# Patient Record
Sex: MALE | Race: WHITE | Employment: OTHER | URBAN - METROPOLITAN AREA
[De-identification: names, ages, dates, MRNs, and addresses within clinical notes are randomized per-mention and may not be internally consistent; named-entity substitution may affect disease eponyms.]

---

## 2019-08-29 ENCOUNTER — TRANSFERRED RECORDS (OUTPATIENT)
Dept: HEALTH INFORMATION MANAGEMENT | Facility: CLINIC | Age: 84
End: 2019-08-29

## 2019-09-04 ENCOUNTER — TRANSFERRED RECORDS (OUTPATIENT)
Dept: HEALTH INFORMATION MANAGEMENT | Facility: CLINIC | Age: 84
End: 2019-09-04

## 2019-09-16 ENCOUNTER — TRANSFERRED RECORDS (OUTPATIENT)
Dept: HEALTH INFORMATION MANAGEMENT | Facility: CLINIC | Age: 84
End: 2019-09-16

## 2020-01-15 ENCOUNTER — TRANSCRIBE ORDERS (OUTPATIENT)
Dept: OTHER | Age: 85
End: 2020-01-15

## 2020-01-15 DIAGNOSIS — M53.3 SACROILIAC PAIN: Primary | ICD-10-CM

## 2020-01-20 NOTE — TELEPHONE ENCOUNTER
DIAGNOSIS: 2nd opinion, sacroiliac pain/ Dr. Clint Echeverria from [place] referring to Dr. Viki Villasenor per pt daughter Tana   APPOINTMENT DATE: Mar 19, 2020    NOTES STATUS DETAILS   OFFICE NOTE from referring provider [facility] Everywhere 1/14/20 Dr. Villanueva and Dr. Gomez, [place]    1/13/20 Dr. Gaffney, [place]   OFFICE NOTE from other specialist In process [facility]    DISCHARGE SUMMARY from hospital N/A    DISCHARGE REPORT from the ER N/A    OPERATIVE REPORT N/A    MEDICATION LIST Internal    IMPLANT RECORD/STICKER N/A    LABS     CBC/DIFF N/A    CULTURES N/A    INJECTIONS DONE IN RADIOLOGY In process [facility] 11/12/19    [place] 1/13/20, 5/2/19, 3/5/19, 2/20/19, 11/9/18, 6/28/18   MRI In process [place] 1/16/20, 3/21/19, 6/12/18   CT SCAN N/A    XRAYS (IMAGES & REPORTS) In process [place] 12/17/19, 2/11/19, 2/5/19, 11/13/18, 4/6/18, 6/24/14   TUMOR     PATHOLOGY  Slides & report N/A       01/19/20 SE  7:32 PM  Faxed request to [facility] for images, and to [facility] for records.     3.4.20 sv 10:40  Images and reports received from [place] and [facility]

## 2020-03-13 DIAGNOSIS — M53.3 SACROILIAC PAIN: Primary | ICD-10-CM

## 2020-03-19 ENCOUNTER — PRE VISIT (OUTPATIENT)
Dept: ORTHOPEDICS | Facility: CLINIC | Age: 85
End: 2020-03-19

## 2020-04-23 ENCOUNTER — TELEPHONE (OUTPATIENT)
Dept: ORTHOPEDICS | Facility: CLINIC | Age: 85
End: 2020-04-23

## 2020-04-23 NOTE — TELEPHONE ENCOUNTER
[facility] Call Center    Phone Message    May a detailed message be left on voicemail: yes     Reason for Call: Other: Brenda from [facility] would like a call back to know if the pt needs the images done right away or if they can wait until after the covid restriction. PLease contact Brenda to discuss     Action Taken: Message routed to:  [facility] ([facility] Ortho    Travel Screening: Not Applicable

## 2020-04-30 ENCOUNTER — TELEPHONE (OUTPATIENT)
Dept: ORTHOPEDICS | Facility: CLINIC | Age: 85
End: 2020-04-30

## 2020-04-30 DIAGNOSIS — M53.3 SACROILIAC PAIN: Primary | ICD-10-CM

## 2020-04-30 NOTE — TELEPHONE ENCOUNTER
Writer called and left pt a message confirming apt on 5/13/20  with Dr. Drew. Writer informed pt that at this time he would not be allowed a visitor with him in the building. If he has questions or concerns he can call back to [phone].     Charla Mcgraw LPN

## 2020-05-13 ENCOUNTER — OFFICE VISIT (OUTPATIENT)
Dept: ORTHOPEDICS | Facility: CLINIC | Age: 85
End: 2020-05-13
Payer: MEDICARE

## 2020-05-13 ENCOUNTER — ANCILLARY PROCEDURE (OUTPATIENT)
Dept: GENERAL RADIOLOGY | Facility: CLINIC | Age: 85
End: 2020-05-13
Attending: ORTHOPAEDIC SURGERY
Payer: MEDICARE

## 2020-05-13 VITALS — WEIGHT: 177.4 LBS | BODY MASS INDEX: 26.89 KG/M2 | HEIGHT: 68 IN

## 2020-05-13 DIAGNOSIS — M79.18 BUTTOCK PAIN: ICD-10-CM

## 2020-05-13 DIAGNOSIS — M25.552 PAIN OF LEFT HIP JOINT: Primary | ICD-10-CM

## 2020-05-13 DIAGNOSIS — M40.30 FLATBACK SYNDROME: ICD-10-CM

## 2020-05-13 RX ORDER — METOPROLOL TARTRATE 25 MG/1
25 TABLET, FILM COATED ORAL 2 TIMES DAILY
COMMUNITY
Start: 2019-08-07 | End: 2020-08-28

## 2020-05-13 RX ORDER — ATORVASTATIN CALCIUM 20 MG/1
1 TABLET, FILM COATED ORAL DAILY
COMMUNITY
Start: 2020-04-21

## 2020-05-13 RX ORDER — LISINOPRIL 5 MG/1
5 TABLET ORAL DAILY
COMMUNITY
Start: 2019-08-07 | End: 2020-08-28

## 2020-05-13 ASSESSMENT — MIFFLIN-ST. JEOR: SCORE: 1459.05

## 2020-05-13 NOTE — LETTER
5/13/2020       RE: Prabha Lewis  [address]  [place] [address]     Dear Colleague,    Thank you for referring your patient, Prabha Lewis, to the [facility] at [facility]. Please see a copy of my visit note below.    88 yo M referred for eval of buttock pain on the left. Has been seen at [facility] by Louis Villanueva (for hip) and Dr. Gaffney (spine) was also scheduled to see Dr. Ogden (SI) but he is out medically.    He has had multiple injections (images not available to me- some reports thru clinician notes from [facility] available). Apparently hip and SI injections gave similar pain relief.    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 He tried gabapentin no relief. Feels best in a recliner.            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     Current Medications:   Current Outpatient Prescriptions        Current Outpatient  Prescriptions   Medication Sig     NO ACTIVE MEDICATIONS .        Allergies:        Allergies   Allergen Reactions     Penicillins Rash     Past Social History:  reports that he has never smoked. He has never used smokeless tobacco. He reports that he drinks alcohol.     Family History: family history includes EYE* in his mother; Heart in his mother; Hypertension in his mother; Prostatic CA in his father; and Stroke in his mother.       Physical exam:  WDWNM appearing his stated age. Trouble hearing, hearing aids in place. Vision able to see and understand images.    Stance positive sagittal imbalance (flatback syndrome). Gait holds on to furniture or walker.    Negative log roll bilateral. 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    Imaging today- AP and lat full spine. Findings c/w prior lumbar imaging. Preserved hip joint space.              A: Buttock pain in patient with physical exam findings c/w hip based pain rather than SI based pain. With a negative SI physical exam he is not a candidate for an SI fusion. 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 This is certainly resulting in posterior erector spinae over pull on his posterior pelvis. He is ankylosed with marginal osteophytes in his lumbar spine so not mobile. He does appear to have foraminal stenosis at L4-S1. His symptoms are not clearly radicular (unclear if he has had epidural steroids or selective nerve root blocks). By physical exam today it appears more to be hip than SI.    P: I told him that Dr. Villanueva is an excellent hip surgeon and it would be reasonable for him to go back to see him. If that does not work out I would be happy to make a different referral. It is possible that he has  more than one thing going on and that this may require several interventions to address everything. It seems like starting with his hip makes sense and is significantly lower risk than what it would take to address his spine.    Rafael Drew MD

## 2020-05-13 NOTE — PROGRESS NOTES
86 yo M referred for eval of buttock pain on the left. Has been seen at [facility] by Louis Villanueva (for hip) and Dr. Gaffney (spine) was also scheduled to see Dr. Ogden (SI) but he is out medically.    He has had multiple injections (images not available to me- some reports thru clinician notes from [facility] available). Apparently hip and SI injections gave similar pain relief.    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 He tried gabapentin no relief. Feels best in a recliner.            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     Current Medications:   Current Outpatient Prescriptions        Current Outpatient Prescriptions   Medication Sig     NO ACTIVE MEDICATIONS .        Allergies:        Allergies   Allergen Reactions     Penicillins Rash     Past Social History:  reports that he has never smoked. He has never used smokeless tobacco. He reports that he drinks alcohol.     Family History: family history includes  EYE* in his mother; Heart in his mother; Hypertension in his mother; Prostatic CA in his father; and Stroke in his mother.       Physical exam:  WDWNM appearing his stated age. Trouble hearing, hearing aids in place. Vision able to see and understand images.    Stance positive sagittal imbalance (flatback syndrome). Gait holds on to furniture or walker.    Negative log roll bilateral. 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    Imaging today- AP and lat full spine. Findings c/w prior lumbar imaging. Preserved hip joint space.              A: Buttock pain in patient with physical exam findings c/w hip based pain rather than SI based pain. With a negative SI physical exam he is not a candidate for an SI fusion. 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 This is certainly resulting in posterior erector spinae over pull on his posterior pelvis. He is ankylosed with marginal osteophytes in his lumbar spine so not mobile. He does appear to have foraminal stenosis at L4-S1. His symptoms are not clearly radicular (unclear if he has had epidural steroids or selective nerve root blocks). By physical exam today it appears more to be hip than SI.    P: I told him that Dr. Villanueva is an excellent hip surgeon and it would be reasonable for him to go back to see him. If that does not work out I would be happy to make a different referral. It is possible that he has more than one thing going on and that this may require several interventions to address everything. It seems like starting with his hip makes sense and is significantly lower risk than what it would take to address his spine.    Rafael Drew MD

## 2020-05-13 NOTE — NURSING NOTE
"Reason For Visit:   Chief Complaint   Patient presents with     Consult     2nd opinion, sacroiliac pain/ Dr. Clint Echeverria from [place] referring to Dr. Viki Villasenor per pt daughter Tana- [phone]        Primary MD: Darcie Acuna  Ref. MD:  Dr. Clint Echeverria    ?  No  Occupation NO.    Date of injury: No  Type of injury: No.  Date of surgery: 9/2019  Type of surgery: Scraped the bones    Smoker: No  Request smoking cessation information: No    Ht 1.735 m (5' 8.31\")   Wt 80.5 kg (177 lb 6.4 oz)   BMI 26.73 kg/m      Pain Assessment  Patient Currently in Pain: Yes  0-10 Pain Scale: 4  Primary Pain Location: Back(low back and left hip)    Oswestry (LAURI) Questionnaire    OSWESTRY DISABILITY INDEX 5/13/2020   Count 9   Sum 21   Oswestry Score (%) 46.67   Some recent data might be hidden          Visual Analog Pain Scale  Back Pain Scale 0-10: 4  Right leg pain: 0  Left leg pain: 0  Neck Pain Scale 0-10: 0  Right arm pain: 0  Left arm pain: 0    Promis 10 Assessment    PROMIS 10 5/13/2020   In general, would you say your health is: Good   In general, would you say your quality of life is: Good   In general, how would you rate your physical health? Good   In general, how would you rate your mental health, including your mood and your ability to think? Good   In general, how would you rate your satisfaction with your social activities and relationships? Good   In general, please rate how well you carry out your usual social activities and roles Fair   To what extent are you able to carry out your everyday physical activities such as walking, climbing stairs, carrying groceries, or moving a chair? A little   How often have you been bothered by emotional problems such as feeling anxious, depressed or irritable? Never   How would you rate your fatigue on average? None   How would you rate your pain on average?   0 = No Pain  to  10 = Worst Imaginable Pain 4   In general, would you say your health is: 3 "   In general, would you say your quality of life is: 3   In general, how would you rate your physical health? 3   In general, how would you rate your mental health, including your mood and your ability to think? 3   In general, how would you rate your satisfaction with your social activities and relationships? 3   In general, please rate how well you carry out your usual social activities and roles. (This includes activities at home, at work and in your community, and responsibilities as a parent, child, spouse, employee, friend, etc.) 2   To what extent are you able to carry out your everyday physical activities such as walking, climbing stairs, carrying groceries, or moving a chair? 2   In the past 7 days, how often have you been bothered by emotional problems such as feeling anxious, depressed, or irritable? 1   In the past 7 days, how would you rate your fatigue on average? 1   In the past 7 days, how would you rate your pain on average, where 0 means no pain, and 10 means worst imaginable pain? 4   Global Mental Health Score 14   Global Physical Health Score 13   PROMIS TOTAL - SUBSCORES 27   Some recent data might be hidden                Charla Mcgraw LPN

## 2020-11-16 ENCOUNTER — HEALTH MAINTENANCE LETTER (OUTPATIENT)
Age: 85
End: 2020-11-16

## 2021-09-18 ENCOUNTER — HEALTH MAINTENANCE LETTER (OUTPATIENT)
Age: 86
End: 2021-09-18

## 2022-01-08 ENCOUNTER — HEALTH MAINTENANCE LETTER (OUTPATIENT)
Age: 87
End: 2022-01-08

## 2022-11-20 ENCOUNTER — HEALTH MAINTENANCE LETTER (OUTPATIENT)
Age: 87
End: 2022-11-20

## 2023-04-15 ENCOUNTER — HEALTH MAINTENANCE LETTER (OUTPATIENT)
Age: 88
End: 2023-04-15

## 2025-04-12 ENCOUNTER — LAB REQUISITION (OUTPATIENT)
Dept: LAB | Facility: CLINIC | Age: OVER 89
End: 2025-04-12
Payer: MEDICARE

## 2025-04-12 DIAGNOSIS — I13.10 HYPERTENSIVE HEART AND CHRONIC KIDNEY DISEASE WITHOUT HEART FAILURE, WITH STAGE 1 THROUGH STAGE 4 CHRONIC KIDNEY DISEASE, OR UNSPECIFIED CHRONIC KIDNEY DISEASE: ICD-10-CM

## 2025-04-15 LAB
ANION GAP SERPL CALCULATED.3IONS-SCNC: 11 MMOL/L (ref 7–15)
BUN SERPL-MCNC: 24.7 MG/DL (ref 8–23)
CALCIUM SERPL-MCNC: 8.9 MG/DL (ref 8.8–10.4)
CHLORIDE SERPL-SCNC: 105 MMOL/L (ref 98–107)
CREAT SERPL-MCNC: 0.86 MG/DL (ref 0.67–1.17)
EGFRCR SERPLBLD CKD-EPI 2021: 81 ML/MIN/1.73M2
ERYTHROCYTE [DISTWIDTH] IN BLOOD BY AUTOMATED COUNT: 16.7 % (ref 10–15)
GLUCOSE SERPL-MCNC: 106 MG/DL (ref 70–99)
HCO3 SERPL-SCNC: 22 MMOL/L (ref 22–29)
HCT VFR BLD AUTO: 32.9 % (ref 40–53)
HGB BLD-MCNC: 9.9 G/DL (ref 13.3–17.7)
MCH RBC QN AUTO: 29.6 PG (ref 26.5–33)
MCHC RBC AUTO-ENTMCNC: 30.1 G/DL (ref 31.5–36.5)
MCV RBC AUTO: 98 FL (ref 78–100)
PLATELET # BLD AUTO: 354 10E3/UL (ref 150–450)
POTASSIUM SERPL-SCNC: 4.1 MMOL/L (ref 3.4–5.3)
RBC # BLD AUTO: 3.35 10E6/UL (ref 4.4–5.9)
SODIUM SERPL-SCNC: 138 MMOL/L (ref 135–145)
WBC # BLD AUTO: 9 10E3/UL (ref 4–11)

## 2025-06-05 ENCOUNTER — LAB REQUISITION (OUTPATIENT)
Dept: LAB | Facility: CLINIC | Age: OVER 89
End: 2025-06-05
Payer: MEDICARE

## 2025-06-05 DIAGNOSIS — D50.9 IRON DEFICIENCY ANEMIA, UNSPECIFIED: ICD-10-CM

## 2025-06-10 LAB
HGB BLD-MCNC: 11.6 G/DL (ref 13.3–17.7)
MCV RBC AUTO: 99 FL (ref 78–100)